# Patient Record
Sex: MALE | Race: WHITE | NOT HISPANIC OR LATINO | URBAN - METROPOLITAN AREA
[De-identification: names, ages, dates, MRNs, and addresses within clinical notes are randomized per-mention and may not be internally consistent; named-entity substitution may affect disease eponyms.]

---

## 2017-10-02 ENCOUNTER — HOSPITAL ENCOUNTER (OUTPATIENT)
Dept: RADIOLOGY | Facility: MEDICAL CENTER | Age: 62
End: 2017-10-02
Attending: ORTHOPAEDIC SURGERY
Payer: OTHER MISCELLANEOUS

## 2017-10-02 DIAGNOSIS — R06.00 DYSPNEA, PAROXYSMAL NOCTURNAL: ICD-10-CM

## 2017-10-02 PROCEDURE — 71020 DX-CHEST-2 VIEWS: CPT

## 2018-06-19 DIAGNOSIS — Z95.5 STENTED CORONARY ARTERY: Primary | ICD-10-CM

## 2018-06-28 ENCOUNTER — NON-PROVIDER VISIT (OUTPATIENT)
Dept: CARDIOLOGY | Facility: MEDICAL CENTER | Age: 63
End: 2018-06-28
Payer: COMMERCIAL

## 2018-06-28 DIAGNOSIS — Z95.5 HISTORY OF HEART ARTERY STENT: ICD-10-CM

## 2018-06-28 LAB — EKG IMPRESSION: NORMAL

## 2018-06-28 PROCEDURE — G0422 INTENS CARDIAC REHAB W/EXERC: HCPCS | Performed by: FAMILY MEDICINE

## 2018-06-28 PROCEDURE — G0423 INTENS CARDIAC REHAB NO EXER: HCPCS | Mod: 59 | Performed by: FAMILY MEDICINE

## 2018-06-28 ASSESSMENT — PATIENT HEALTH QUESTIONNAIRE - PHQ9: SUM OF ALL RESPONSES TO PHQ QUESTIONS 1-9: 13

## 2018-06-28 NOTE — PROGRESS NOTES
Cardiac Rehab Individual Treatment Plan Assessment: Initial 18 Session #     1   MRN: [id number]   Allergies: Morphine   Patient Name: Hector Hanson : 1955 Risk Stratification: High    Diagnoses:   1. History of heart artery stent     Age: 63 y.o. Physician: Anupam Merritt M.D.    Date of Event: 18 Specialist: Amos ([facility])   Risk Factors:  Hypertension, Hyperlipidemia, Obesity, Stress, Age, Male > 45   Exercise Nutrition Education Psychosocial   Stages of change Stages of change Stages of change Stages of change   Preparation Preparation Preparation Preparation   Fitness Test Lipids Learning Barriers Outcome Survey Tools   DIST: 1359  Available: Yes Learning Barriers: Ready to Learn FP QOL Overall Score: 14   Max HR: 80 Date: 11 Family Support PHQ-9: 13   RPE: 9 Total: 172 mg/dL Yes Nutrition Screen: 57 %   SPO2: 95 % Tri mg/dL Lives: Spouse Intervention   MET: 3.1 HDL: 27 mg/dL Tobacco Use Behavioral Health Consult: Yes   EF= 65 LDL: 89 mg/dL History   Smoking Status   • Former Smoker   • Packs/day: 1.50   • Years: 13.00   • Types: Cigarettes, Cigars   • Quit date: 1981   Smokeless Tobacco   • Never Used      Physician Referral: Yes   Ambulatory Status Diabetes Smoking Intervention Identifies Stressors: Yes   Fall Risk Assessed: Yes Diabetes: No Smoking Cessation Referral: N/A Drug Intervention: No   Exercise Ambulatory Status Assist Devices: None HbA1C:  (n/a) Date:  (not available) Ind. Education / Counseling: N/A Education   Exercise Prescription Monitors BS at Home: No Tobacco Adjunct: N/A Psychosocial Education: Coping Techniques, Positive Support System, S/S Depression   Mode: Treadmill, NuStep, UBE, Airdyne   Frequency:  (n/a) Random BS:  (n/a) Education Intervention Target Goal   Frequency:  2 days/week Weight Management Healthy Heart Education: Class Schedule Given, Medications Reviewed, Patient Education Binder Provided, Risk Factors Discussed,  "Videos Viewed per Huma Assess presence or absence of depression using a valid screening: Yes   Duration:  15 to 20 mins Weight: 100.7 kg (222 lb) Target Goal Use Stress Management: Yes   Intensity:  11-13 RPE Height: 175.3 cm (5' 9.02\") Complete Tobacco Cessation: Complete Tobacco Cessation: N/A Adverse Events: Yes   METS:  3.0-5.0 BMI (Calculated): 32.77 Educate / Review and have understanding of cardiac disease prevention: Educate / Review and have understanding of cardiac disease prevention: Yes Unexpected Events: Yes   Progression:  ^ increments of 1-3 to THR/RPE as tolerated Goal weight: 200 Medication Compliance: Yes    THR: THR: Other (see comment) History   Alcohol Use   • Yes     Comment: 2-3 drinks a week                        2 cigars a month, none anymore either         Angina with Exercise?  Angina with Exercise: No (113-123)      Resistance Training?  Resistance Training: Yes      Hypertension      Diagnosed with HTN: Yes Intervention      Resting BP: 147/81 Dietician Consult/Class: Pending      Peak Ex BP: 165/78 Nurse/Patient Discussion: Yes     Intervention Diabetes Ed Referral: N/A     Home Exercise:  Yes Discuss Maintenance /Wt Loss: Yes     Mode: Walk Attend [employer]: Pending     Duration: 10 to 20 mins Dietary Goal: >=58%Rate Your Plate     Frequency: 7 days active Education     Education Nutrition Education: Healthy Eating, Sodium Reduction     Equipment Orientation, Exercise Safety, S/S to Report, RPE Scale, Warm Up / Cool Down, Physically Active Target Goal     Target Goal LDL-C < 100 if trig. > 200:  N/A       Start Individual Exercise Rx Yes LDL-C < 70 for high risk patient:  Yes       BP < 140/90 or < 130/80 if DM or CKD Yes Non HDL-C Should be < 130:  Yes       Aerobic activity 30 + min / day 5 days / wk Yes HbA1C < 7%: N/A         BMI < 25: Yes       Notes: Hector had 9 stents placed on 5/23/2018.  We will monitor all 36 visits.  He has a 45 degree limitation on the left " "shoulder.  He has had multiple cervical, shoulders bilateral and knee surgery's.  Intial weight routine with <5#.  Initial Assessment:  Heart Sounds: S1 S2 audible and regular       Lung Sounds: Clear Bilateral       Edema: None       Right Peripheral Pulses:  Carotid: 2+  Radial: 2+  Dorsalis Pedis: 2+  Posterior Tibialis: 2+   Left Peripheral Pulses:  Carotid: 2+  Radial: 2+  Dorsalis Pedis: 2+  Posterior Tibialis: 2+    Incision: None      Color: Skin is pink and dry       Frame Size: Large        Cognitive Assessment: Memory is good with a very pleasant affect.      Fall Assessment:    Gait: Steady  Balance: Good  Upper Body: Right Shoulder 45 degree limitation.  Right arm Full ROM  Lower Body: Full ROM   Recent Falls: None  Current Medications and Vaccinations: Reviewed  Patient Stated Goals: \"I would like to get down to 200#, and I'm hoping to learn more about a heart healthy diet and hopefully sleep better.\"  Other: Exercise Current:  10 minutes walk daily.  Exercise Goal:  15 to 20 minutes daily walk.  Progress:  Active 7 days a week.  Nutrition Current:  Low fat, low cholesterol, low sodium.  Nutrition Goal:  Introduce Pritikin Program:  Incorporate more fruits and vegetables into daily meals.  Progress:  57 score on Rate Your Plate.  Stress Current:  Hector worries about his health just having 9 stents placed in one event, also having had multiple neck, shouler and knee surgeries.  Stress Goal:  Healthy mindset video and lecture.  Progress:  Hector presents today with a healthy positive attitude to build an exercise/diet program.       "

## 2018-06-29 NOTE — PROGRESS NOTES
Date: 6/28/2018    Initial Individual Treatment Plan review for the Pending sale to [facility] Intensive Cardiac Rehabilitation (ICR) Program.    Hector Hanson, 63 y.o. male, with qualifying diagnosis/diagnoses of S/P Stent.  Co-morbid conditions include Hypertension, Hyperlipidemia, Obesity, Stress, Age, Male > 45.    Primary Care Provider: Anupam Merritt M.D.    Heart Specialist (s): Dr. Trinidad    Patient has successfully completed 1 Exercise Sessions, and an appropriate number of corresponding Education Sessions.  Patient is an excellent candidate for the [facility] Intensive [facility] (ICR) Program.    I will review the Individual Treatment Plan again at 30 day post-initiation of ICR.    Alexander Burgos MD, [facility]  , [facility] Intensive [facility] ([facility]CR) Program

## 2018-07-02 ENCOUNTER — NON-PROVIDER VISIT (OUTPATIENT)
Dept: CARDIOLOGY | Facility: MEDICAL CENTER | Age: 63
End: 2018-07-02
Payer: COMMERCIAL

## 2018-07-02 DIAGNOSIS — Z95.5 HISTORY OF HEART ARTERY STENT: ICD-10-CM

## 2018-07-02 LAB — EKG IMPRESSION: NORMAL

## 2018-07-02 PROCEDURE — G0422 INTENS CARDIAC REHAB W/EXERC: HCPCS | Performed by: FAMILY MEDICINE

## 2018-07-02 PROCEDURE — G0423 INTENS CARDIAC REHAB NO EXER: HCPCS | Mod: 59 | Performed by: FAMILY MEDICINE

## 2018-07-02 NOTE — PROGRESS NOTES
Hector Hanson attended: Healthy Mindset Workshop from 9:00-10:00am.  Workshop Title: Targeting Your Nutrition Priorities.      Lecture was attended and patient questions addressed. The patient will continue workshops and nutrition education.

## 2018-07-06 ENCOUNTER — NON-PROVIDER VISIT (OUTPATIENT)
Dept: CARDIOLOGY | Facility: MEDICAL CENTER | Age: 63
End: 2018-07-06
Payer: COMMERCIAL

## 2018-07-06 DIAGNOSIS — Z95.5 HISTORY OF HEART ARTERY STENT: ICD-10-CM

## 2018-07-06 LAB — EKG IMPRESSION: NORMAL

## 2018-07-06 PROCEDURE — G0423 INTENS CARDIAC REHAB NO EXER: HCPCS | Mod: 59 | Performed by: FAMILY MEDICINE

## 2018-07-06 PROCEDURE — G0422 INTENS CARDIAC REHAB W/EXERC: HCPCS | Performed by: FAMILY MEDICINE

## 2018-07-06 NOTE — PROGRESS NOTES
Hector Hanson attending cooking school from 9:00-10:00 am   Today  prepared Pumpkin Pie.     Patient received handouts and nutrition information regarding the specific recipes.

## 2018-07-09 ENCOUNTER — NON-PROVIDER VISIT (OUTPATIENT)
Dept: CARDIOLOGY | Facility: MEDICAL CENTER | Age: 63
End: 2018-07-09
Payer: COMMERCIAL

## 2018-07-09 ENCOUNTER — NON-PROVIDER VISIT (OUTPATIENT)
Dept: CARDIOLOGY | Facility: MEDICAL CENTER | Age: 63
End: 2018-07-09

## 2018-07-09 DIAGNOSIS — Z95.5 HISTORY OF HEART ARTERY STENT: ICD-10-CM

## 2018-07-09 LAB — EKG IMPRESSION: NORMAL

## 2018-07-09 PROCEDURE — G0422 INTENS CARDIAC REHAB W/EXERC: HCPCS | Performed by: FAMILY MEDICINE

## 2018-07-09 PROCEDURE — G0423 INTENS CARDIAC REHAB NO EXER: HCPCS | Mod: 59 | Performed by: FAMILY MEDICINE

## 2018-07-09 NOTE — PROGRESS NOTES
Nutrition Consult Note:    Bindu Stokes  Date & Time note created:    7/9/2018   10:31 AM     Referring MD:   No ref. provider found  Time: 9:00-10:00 am     Patient ID:   Name:             Hector Hanson   YOB: 1955  Age:                 63 y.o.  male   MRN:               [id number]        Hector Hanson is here today for Intensive Cardiac Rehab.  This program includes Nutrition Education and Counseling following the Pritikin guidelines which includes at least 5 servings of vegetables, 4 servings of fruit, 2 or less servings of dairy, at least 5 servings of whole grains, animal fat from fish/lean meat, plant-based protein and no added salt (goal 1200-1500mg sodium per day).        Current Diet:  Patient is currently making drastic changes to his diet and has cut the amount of meat he is consuming by a significant degree. He has been provided with a list of alternative processed foods that are low in sugar, sodium and fat and will focus on making better choices. Patient reports that the men in his family pass away in their 50s/60s due to heart disease and he wishes to avoid this.     Current Weight:   222 lbs      IBWR:   160 lbs + 10% w/ large frame                            Current Living SItuation:  With spouse     Comprehensive Nutrition Education Instruction or training leading to in-depth nutrition related knowledge about:   Portion control  Discussed grocery shopping tips and label reading  Meal planning  Culinary instruction, including cooking without salt or added fat  The effect of sodium and saturated fats on heart disease risk factors   Identified ways to self monitor diet - food journaling  Reviewed benefits of exercise and helped pt set exercise goals      VIDEOS VIEWED:  2/16    Past Medical History:   Past Medical History:   Diagnosis Date   • Cancer 2015    skin (multiple lesions removed on face and one on left arm)   • Hypertension    • Pain    • Pneumonia 1980's   •  Sleep apnea     cpap   • Snoring        Past Surgical History:  Past Surgical History:   Procedure Laterality Date   • CERVICAL DISK AND FUSION ANTERIOR N/A 9/19/2016    Procedure: CERVICAL DISK AND FUSION ANTERIOR  C5-6 W/PLATE ;  Surgeon: Bravo Portillo M.D.;  Location: SURGERY [facility];  Service:    • CERVICAL JIM DISC N/A 9/19/2016    Procedure: CERVICAL DISC REPLACEMENT C3-4 ARTIFICIAL ;  Surgeon: Bravo Portillo M.D.;  Location: SURGERY [facility];  Service:    • HAND SURGERY Right 2013    reverse trigger finger release   • TRANS URETHRAL RESECTION PROSTATE  2013   • SHOULDER ARTHROSCOPY W/ ROTATOR CUFF REPAIR Right 2012    right bicep detachment repair too   • INGUINAL HERNIA REPAIR Left 2006   • OTHER     • SHOULDER ARTHROSCOPY W/ ROTATOR CUFF REPAIR Left 2014, 2015    bicep tear, rotoator cuff repair, redo 2015       Current Outpatient Medications:  Current Outpatient Prescriptions   Medication Sig Dispense Refill   • Oxycodone-Acetaminophen (PERCOCET-10)  MG Tab Take 1 Tab by mouth every four hours as needed for Moderate Pain. 60 Tab 0   • tizanidine (ZANAFLEX) 4 MG Tab Take 1 Tab by mouth 4 times a day. Indications: Muscle Spasticity 60 Tab 3   • MethylPREDNISolone (MEDROL DOSEPAK) 4 MG Tablet Therapy Pack Take as directed 30 Tab 1   • lisinopril (PRINIVIL, ZESTRIL) 40 MG tablet Take 40 mg by mouth every day.     • diazepam (VALIUM) 5 MG Tab Take 5 mg by mouth every 8 hours as needed for Anxiety.     • Multiple Vitamins-Minerals (MULTIVITAMIN ADULT PO) Take 2 Tabs by mouth every day.     • Coenzyme Q10 (CO Q 10) 100 MG Cap Take 300 mg by mouth every day.     • tizanidine (ZANAFLEX) 2 MG capsule Take 2 mg by mouth as needed. Indications: Muscle Spasticity     • Non Formulary Request Take 1 Cap by mouth every day. CLA capsule  Once daily     • fenofibrate (TRICOR) 145 MG TABS Take 145 mg by mouth every day.     • Oxycodone-Acetaminophen (PERCOCET-10)  MG TABS Take 1-2 Tabs by  mouth every four hours as needed for Moderate Pain.       No current facility-administered medications for this visit.          Allergies:  Allergies   Allergen Reactions   • Morphine Vomiting and Nausea       Family History:  No family history on file.    Social History:  Social History     Social History   • Marital status:      Spouse name: N/A   • Number of children: N/A   • Years of education: N/A     Occupational History   • Not on file.     Social History Main Topics   • Smoking status: Former Smoker     Packs/day: 1.50     Years: 13.00     Types: Cigarettes, Cigars     Quit date: 1/1/1981   • Smokeless tobacco: Never Used   • Alcohol use Yes      Comment: 2-3 drinks a week                        2 cigars a month, none anymore either   • Drug use: No   • Sexual activity: Not on file     Other Topics Concern   • Not on file     Social History Narrative   • No narrative on file       Strengths:  Supportive spouse, high motivation    Barriers:   Pt reports familial high cholesterol    Goals set at this visit:  1. Patient will review patient resources and focus on limiting meat to every other day.   2. Patient will watch an educational video in addition to education on Mondays/Fridays     Follow up:  As needed.

## 2018-07-13 ENCOUNTER — NON-PROVIDER VISIT (OUTPATIENT)
Dept: CARDIOLOGY | Facility: MEDICAL CENTER | Age: 63
End: 2018-07-13
Payer: COMMERCIAL

## 2018-07-13 DIAGNOSIS — Z95.5 HISTORY OF HEART ARTERY STENT: ICD-10-CM

## 2018-07-13 LAB — EKG IMPRESSION: NORMAL

## 2018-07-13 PROCEDURE — G0423 INTENS CARDIAC REHAB NO EXER: HCPCS | Mod: 59 | Performed by: FAMILY MEDICINE

## 2018-07-13 PROCEDURE — G0422 INTENS CARDIAC REHAB W/EXERC: HCPCS | Performed by: FAMILY MEDICINE

## 2018-07-13 NOTE — PROGRESS NOTES
Hector Hanson attending cooking school from  9:00-10:00 am   Today  prepared Paella.     Patient received handouts and nutrition information regarding the specific recipes.

## 2018-07-16 ENCOUNTER — NON-PROVIDER VISIT (OUTPATIENT)
Dept: CARDIOLOGY | Facility: MEDICAL CENTER | Age: 63
End: 2018-07-16
Payer: COMMERCIAL

## 2018-07-16 DIAGNOSIS — Z95.5 HISTORY OF HEART ARTERY STENT: ICD-10-CM

## 2018-07-16 LAB — EKG IMPRESSION: NORMAL

## 2018-07-16 PROCEDURE — G0423 INTENS CARDIAC REHAB NO EXER: HCPCS | Mod: 59 | Performed by: FAMILY MEDICINE

## 2018-07-16 PROCEDURE — G0422 INTENS CARDIAC REHAB W/EXERC: HCPCS | Performed by: FAMILY MEDICINE

## 2018-07-16 ASSESSMENT — PATIENT HEALTH QUESTIONNAIRE - PHQ9: SUM OF ALL RESPONSES TO PHQ QUESTIONS 1-9: 13

## 2018-07-16 NOTE — PROGRESS NOTES
Hector Hanson attending [employer] from  Healthy Mindset Workshop from 9:00-10:00 am.      The topic was: New Thoughts, New Behaviors.     Patient received handouts regarding the specific exercise information.

## 2018-07-16 NOTE — PROGRESS NOTES
Cardiac Rehab Individual Treatment Plan Assessment: 30 day 07/16/18 Session #     6   MRN: [id number]   Allergies: Morphine   Patient Name: Hector Hanson : 1955 Risk Stratification: High    Diagnoses:   1. History of heart artery stent     Age: 63 y.o. Physician: Anupam Merritt M.D.    Date of Event: 18 Specialist: Amos ([facility])   Risk Factors:  Hypertension, Hyperlipidemia, Obesity, Stress, Age, Male > 45   Exercise Nutrition Education Psychosocial   Stages of change Stages of change Stages of change Stages of change   Preparation Preparation Preparation Preparation   Fitness Test Lipids Learning Barriers Outcome Survey Tools   DIST: 1359 (orientation data)  Available: Yes Learning Barriers: Ready to Learn FP QOL Overall Score: 14 (orientation data)   Max HR: 80 (orientation data) Date: 11 Family Support PHQ-9: 13 (orientation data)   RPE: 9 (orientation data) Total: 172 mg/dL Yes Nutrition Screen: 57 % (orientation data)   SPO2: 95 % (orientation data) Tri mg/dL Lives: Spouse Intervention   MET: 3.1 (orientation data) HDL: 27 mg/dL Tobacco Use Behavioral Health Consult: N/A (assessed pre and post program)   EF= 65 LDL: 89 mg/dL History   Smoking Status   • Former Smoker   • Packs/day: 1.50   • Years: 13.00   • Types: Cigarettes, Cigars   • Quit date: 1981   Smokeless Tobacco   • Never Used      Physician Referral: No   Ambulatory Status Diabetes Smoking Intervention Identifies Stressors: Yes   Fall Risk Assessed: Yes Diabetes: No Smoking Cessation Referral: N/A Drug Intervention: No   Exercise Ambulatory Status Assist Devices: None HbA1C:  (n/a) Date:  (not available) Ind. Education / Counseling: N/A Education   Exercise Prescription Monitors BS at Home: No Tobacco Adjunct: N/A Psychosocial Education: Coping Techniques, Positive Support System, S/S Depression   Mode: Treadmill, NuStep, UBE, Airdyne   Frequency:  (n/a) Random BS:  (n/a) Education Intervention  "Target Goal   Frequency:  2 days/week Weight Management Healthy Heart Education: Class Schedule Given, Medications Reviewed, Patient Education Binder Provided, Risk Factors Discussed, Videos Viewed per Huma Assess presence or absence of depression using a valid screening: N/A (assessed pre and post program)   Duration:  15 to 20 mins Weight: 100.7 kg (222 lb) Target Goal Use Stress Management: Yes   Intensity:  11-13 RPE Height: 175.3 cm (5' 9.02\") Complete Tobacco Cessation: Complete Tobacco Cessation: N/A Adverse Events: No   METS:  3.0-5.0 BMI (Calculated): 32.77 Educate / Review and have understanding of cardiac disease prevention: Educate / Review and have understanding of cardiac disease prevention: Yes Unexpected Events: No   Progression:  ^ increments of 1-3 to THR/RPE as tolerated Goal weight: 200 Medication Compliance: Yes    THR: THR: Other (see comment) History   Alcohol Use   • Yes     Comment: 2-3 drinks a week                        2 cigars a month, none anymore either         Angina with Exercise?  Angina with Exercise: No (113-123)      Resistance Training?  Resistance Training: Yes      Hypertension      Diagnosed with HTN: Yes Intervention      Resting BP: 129/70 Dietician Consult/Class: Yes      Peak Ex BP:  (no data) Nurse/Patient Discussion: Yes     Intervention Diabetes Ed Referral: N/A     Home Exercise:  Yes Discuss Maintenance /Wt Loss: Yes     Mode: Walk Attend Cooking School: Yes     Duration: 15-20 mins Dietary Goal: >=58%Rate Your Plate     Frequency: 7 days active Education     Education Nutrition Education: Healthy Eating, Sodium Reduction     Equipment Orientation, Exercise Safety, S/S to Report, RPE Scale, Warm Up / Cool Down, Physically Active Target Goal     Target Goal LDL-C < 100 if trig. > 200:  N/A       Start Individual Exercise Rx Yes LDL-C < 70 for high risk patient:  Yes       BP < 140/90 or < 130/80 if DM or CKD Yes Non HDL-C Should be < 130:  Yes       Aerobic " "activity 30 + min / day 5 days / wk Yes HbA1C < 7%: N/A         BMI < 25: Yes       Notes:   Hector is on his 6th session today. Hector attends [facility] Mondays and Fridays. Hector says he feels like he is getting stronger every day.     Exercise:   Hector says he is walking everyday on his own for about 1.5 - 2 miles each time, twice a day. While at [facility] Hector is completing 3x 8 minutes for his aerobics, plus weights.     New goal: Hector has a goal to start going to the gym on Wednesdays.     Nutrition:   Hector says he has started implementing the Pritikin eating plan. Hector says, \"I have stopped eating red meat completely.\" Hector says he eats oatmeal everyday with fruit. He eats more veggies and makes it a point to eat fruit 3 x a day. Hector says, \"I only eat animal protein 1 x a day which is a fish or some chicken, I have stopped eating eggs and I started eating tofu.\"     New goal: Hector says he has been struggling with eating his nightly bowl of ice cream. Hector says he has a goal to limit his ice cream intake to 3-4 nights a week instead of his usual 7 nights a week.     Stress:   Hector says he doesn't have any big stressors. I have just small stressors and I feel that I manage them well with rest and reading.     New goal: Hector doesn't have any stress management goals at this time.                                 "

## 2018-07-18 NOTE — PROGRESS NOTES
Intensive Cardiac Rehabilitation (ICR) Individual Treatment Plan (ITP) reviewed and signed.  Sincerely,  Alexander Burgos MD

## 2018-07-20 ENCOUNTER — NON-PROVIDER VISIT (OUTPATIENT)
Dept: CARDIOLOGY | Facility: MEDICAL CENTER | Age: 63
End: 2018-07-20
Payer: COMMERCIAL

## 2018-07-20 DIAGNOSIS — Z95.5 HISTORY OF HEART ARTERY STENT: ICD-10-CM

## 2018-07-20 LAB — EKG IMPRESSION: NORMAL

## 2018-07-20 PROCEDURE — G0422 INTENS CARDIAC REHAB W/EXERC: HCPCS | Performed by: FAMILY MEDICINE

## 2018-07-20 PROCEDURE — G0423 INTENS CARDIAC REHAB NO EXER: HCPCS | Mod: 59 | Performed by: FAMILY MEDICINE

## 2018-07-23 ENCOUNTER — NON-PROVIDER VISIT (OUTPATIENT)
Dept: CARDIOLOGY | Facility: MEDICAL CENTER | Age: 63
End: 2018-07-23
Payer: COMMERCIAL

## 2018-07-23 DIAGNOSIS — Z95.5 HISTORY OF HEART ARTERY STENT: ICD-10-CM

## 2018-07-23 LAB — EKG IMPRESSION: NORMAL

## 2018-07-23 PROCEDURE — G0423 INTENS CARDIAC REHAB NO EXER: HCPCS | Mod: 59 | Performed by: FAMILY MEDICINE

## 2018-07-23 PROCEDURE — G0422 INTENS CARDIAC REHAB W/EXERC: HCPCS | Performed by: FAMILY MEDICINE

## 2018-07-23 NOTE — PROGRESS NOTES
Hector Hanson attended the following workshop from 9:00-10:00 am.   Workshop Title: Fueling a Healthy Body.       Lecture was attended and patient questions addressed. The patient will continue workshops and nutrition education.

## 2018-07-27 ENCOUNTER — NON-PROVIDER VISIT (OUTPATIENT)
Dept: CARDIOLOGY | Facility: MEDICAL CENTER | Age: 63
End: 2018-07-27
Payer: COMMERCIAL

## 2018-07-27 DIAGNOSIS — Z95.5 HISTORY OF HEART ARTERY STENT: ICD-10-CM

## 2018-07-27 LAB — EKG IMPRESSION: NORMAL

## 2018-07-27 PROCEDURE — G0423 INTENS CARDIAC REHAB NO EXER: HCPCS | Mod: 59 | Performed by: FAMILY MEDICINE

## 2018-07-27 PROCEDURE — G0422 INTENS CARDIAC REHAB W/EXERC: HCPCS | Performed by: FAMILY MEDICINE

## 2018-08-01 ENCOUNTER — NON-PROVIDER VISIT (OUTPATIENT)
Dept: CARDIOLOGY | Facility: MEDICAL CENTER | Age: 63
End: 2018-08-01
Payer: COMMERCIAL

## 2018-08-01 DIAGNOSIS — Z95.5 HISTORY OF HEART ARTERY STENT: ICD-10-CM

## 2018-08-01 LAB — EKG IMPRESSION: NORMAL

## 2018-08-01 PROCEDURE — G0423 INTENS CARDIAC REHAB NO EXER: HCPCS | Mod: 59 | Performed by: FAMILY MEDICINE

## 2018-08-01 PROCEDURE — G0422 INTENS CARDIAC REHAB W/EXERC: HCPCS | Performed by: FAMILY MEDICINE

## 2018-08-03 ENCOUNTER — NON-PROVIDER VISIT (OUTPATIENT)
Dept: CARDIOLOGY | Facility: MEDICAL CENTER | Age: 63
End: 2018-08-03
Payer: COMMERCIAL

## 2018-08-03 DIAGNOSIS — Z95.5 HISTORY OF HEART ARTERY STENT: ICD-10-CM

## 2018-08-03 LAB — EKG IMPRESSION: NORMAL

## 2018-08-03 PROCEDURE — G0422 INTENS CARDIAC REHAB W/EXERC: HCPCS | Performed by: FAMILY MEDICINE

## 2018-08-03 PROCEDURE — G0423 INTENS CARDIAC REHAB NO EXER: HCPCS | Mod: 59 | Performed by: FAMILY MEDICINE

## 2018-08-03 NOTE — PROGRESS NOTES
Hector Hanson attending cooking school from  9-10:00am.    Today  prepared Borscht.     Patient received handouts and nutrition information regarding the specific recipes.

## 2018-08-06 ENCOUNTER — NON-PROVIDER VISIT (OUTPATIENT)
Dept: CARDIOLOGY | Facility: MEDICAL CENTER | Age: 63
End: 2018-08-06
Payer: COMMERCIAL

## 2018-08-06 DIAGNOSIS — Z95.5 HISTORY OF HEART ARTERY STENT: ICD-10-CM

## 2018-08-06 LAB — EKG IMPRESSION: NORMAL

## 2018-08-06 PROCEDURE — G0422 INTENS CARDIAC REHAB W/EXERC: HCPCS | Performed by: FAMILY MEDICINE

## 2018-08-06 PROCEDURE — G0423 INTENS CARDIAC REHAB NO EXER: HCPCS | Mod: 59 | Performed by: FAMILY MEDICINE

## 2018-08-06 ASSESSMENT — PATIENT HEALTH QUESTIONNAIRE - PHQ9: SUM OF ALL RESPONSES TO PHQ QUESTIONS 1-9: 13

## 2018-08-06 NOTE — NON-PROVIDER
Hector Hanson attended: Healthy Mindset Workshop from 9-10am    The topic was: New Thoughts/New Behaviors-Goal Setting    Patient received handouts regarding the specific information

## 2018-08-06 NOTE — PROGRESS NOTES
Cardiac Rehab Individual Treatment Plan Assessment: 60 day 18 Session #     12   MRN: [id number]   Allergies: Morphine   Patient Name: Hector Hanson : 1955 Risk Stratification: High    Diagnoses:   1. History of heart artery stent     Age: 63 y.o. Physician: Anupam Merritt M.D.    Date of Event: 18 Specialist: Amos ([facility])   Risk Factors:  Hypertension, Hyperlipidemia, Obesity, Stress, Age, Male > 45   Exercise Nutrition Education Psychosocial   Stages of change Stages of change Stages of change Stages of change   Preparation Preparation Preparation Preparation   Fitness Test Lipids Learning Barriers Outcome Survey Tools   DIST: 1359 (orientation data)  Available: Yes Learning Barriers: Ready to Learn FP QOL Overall Score: 14 (orientation data)   Max HR: 80 (orientation data) Date: 11 Family Support PHQ-9: 13 (orientation data)   RPE: 9 (orientation data) Total: 172 mg/dL Yes Nutrition Screen: 57 % (orientation data)   SPO2: 95 % (orientation data) Tri mg/dL Lives: Spouse Intervention   MET: 3.1 (orientation data) HDL: 27 mg/dL Tobacco Use Behavioral Health Consult: N/A (assessed pre and post program)   EF= 65 LDL: 89 mg/dL History   Smoking Status   • Former Smoker   • Packs/day: 1.50   • Years: 13.00   • Types: Cigarettes, Cigars   • Quit date: 1981   Smokeless Tobacco   • Never Used      Physician Referral: No   Ambulatory Status Diabetes Smoking Intervention Identifies Stressors: Yes   Fall Risk Assessed: Yes Diabetes: No Smoking Cessation Referral: N/A Drug Intervention: No   Exercise Ambulatory Status Assist Devices: None HbA1C:  (n/a) Date:  (not available) Ind. Education / Counseling: N/A Education   Exercise Prescription Monitors BS at Home: No Tobacco Adjunct: N/A Psychosocial Education: Coping Techniques, Positive Support System, S/S Depression   Mode: Treadmill, NuStep, UBE, Airdyne   Frequency:  (n/a) Random BS:  (n/a) Education Intervention  "Target Goal   Frequency:  2 days/week Weight Management Healthy Heart Education: Class Schedule Given, Medications Reviewed, Patient Education Binder Provided, Risk Factors Discussed, Videos Viewed per Huma Assess presence or absence of depression using a valid screening: N/A (assessed pre and post program)   Duration:  15 to 20 mins Weight: 100.7 kg (222 lb) Target Goal Use Stress Management: Yes   Intensity:  11-13 RPE Height: 175.3 cm (5' 9.02\") Complete Tobacco Cessation: Complete Tobacco Cessation: N/A Adverse Events: No   METS:  3.0-5.0 BMI (Calculated): 32.77 Educate / Review and have understanding of cardiac disease prevention: Educate / Review and have understanding of cardiac disease prevention: Yes Unexpected Events: No   Progression:  ^ increments of 1-3 to THR/RPE as tolerated Goal weight: 200 Medication Compliance: Yes    THR: THR: Other (see comment) History   Alcohol Use   • Yes     Comment: 2-3 drinks a week                        2 cigars a month, none anymore either         Angina with Exercise?  Angina with Exercise: No (113-123)      Resistance Training?  Resistance Training: Yes      Hypertension      Diagnosed with HTN: Yes Intervention      Resting BP: 146/75 Dietician Consult/Class: Yes      Peak Ex BP:  (no data) Nurse/Patient Discussion: Yes     Intervention Diabetes Ed Referral: N/A     Home Exercise:  Yes Discuss Maintenance /Wt Loss: Yes     Mode: Walk Attend Cooking School: Yes     Duration: 15-20 mins Dietary Goal: >=58%Rate Your Plate     Frequency: 7 days active Education     Education Nutrition Education: Healthy Eating, Sodium Reduction     Equipment Orientation, Exercise Safety, S/S to Report, RPE Scale, Warm Up / Cool Down, Physically Active Target Goal     Target Goal LDL-C < 100 if trig. > 200:  N/A       Start Individual Exercise Rx Yes LDL-C < 70 for high risk patient:  Yes       BP < 140/90 or < 130/80 if DM or CKD Yes Non HDL-C Should be < 130:  Yes       Aerobic " activity 30 + min / day 5 days / wk Yes HbA1C < 7%: N/A         BMI < 25: Yes          Notes:   Hector is on his 12th session. Hector attends [facility] Mondays and Fridays. Hector is motivated to improve his strength and endurance.     Exercise:   Hector has continued to walk about 3 mile a day on his own, twice a day. While at [facility] Hector has started walking on the treadmill for 30 minutes continuous, plus weights. Hector says he is starting to feel good again and enjoys the program.     Previous goal: Hector had a goal to start going to the gym on Wednesday but his schedule hasn't allowed him to start that routine yet.     New goal: Hector has a goal to start going to the gym to lift on Wednesdays.     Nutrition:  Hector says he is starting to tweak little things in his diet more and more to make it more Pritikin friendly. Hector has continued to refrain from eating red meat and high sodium food. Hector says he has been mindful to eat more fruits and veggies.      Previous goal: Hector had a goal to limit his ice cream eating to 3 nights a week. This goal was met Hector says he eats ice cream only 2 nights a week now instead of 7 nights a week.     New goal: Hector said he doesn't need a dietary goal right now.     Stress:   Hector has continued to remain stress free and feeling happy and healthy enough to fernando any stressors as they arise.     New goal:  Hector doesn't feel the need to make any stress management goals at this time.

## 2018-08-13 ENCOUNTER — NON-PROVIDER VISIT (OUTPATIENT)
Dept: CARDIOLOGY | Facility: MEDICAL CENTER | Age: 63
End: 2018-08-13
Payer: COMMERCIAL

## 2018-08-13 DIAGNOSIS — Z95.5 HISTORY OF HEART ARTERY STENT: ICD-10-CM

## 2018-08-13 LAB — EKG IMPRESSION: NORMAL

## 2018-08-13 PROCEDURE — G0423 INTENS CARDIAC REHAB NO EXER: HCPCS | Mod: 59 | Performed by: FAMILY MEDICINE

## 2018-08-13 PROCEDURE — G0422 INTENS CARDIAC REHAB W/EXERC: HCPCS | Performed by: FAMILY MEDICINE

## 2018-08-13 NOTE — PROGRESS NOTES
Hector Hanson attended the following workshop from 9:00-10:00am.   Workshop Title: Label Reading.       Lecture was attended and patient questions addressed. The patient will continue workshops and nutrition education.

## 2018-08-17 ENCOUNTER — NON-PROVIDER VISIT (OUTPATIENT)
Dept: CARDIOLOGY | Facility: MEDICAL CENTER | Age: 63
End: 2018-08-17
Payer: COMMERCIAL

## 2018-08-17 DIAGNOSIS — Z95.5 HISTORY OF HEART ARTERY STENT: ICD-10-CM

## 2018-08-17 LAB — EKG IMPRESSION: NORMAL

## 2018-08-17 PROCEDURE — G0422 INTENS CARDIAC REHAB W/EXERC: HCPCS | Performed by: FAMILY MEDICINE

## 2018-08-17 PROCEDURE — G0423 INTENS CARDIAC REHAB NO EXER: HCPCS | Mod: 59 | Performed by: FAMILY MEDICINE

## 2018-08-17 NOTE — PROGRESS NOTES
Hector Hanson attending cooking school from  9:00-10:00am.  Today  prepared Pumpkin Pancakes with Fresh Balsamic Peach Syrup.    Patient received handouts and nutrition information regarding the specific recipes.

## 2018-08-20 ENCOUNTER — NON-PROVIDER VISIT (OUTPATIENT)
Dept: CARDIOLOGY | Facility: MEDICAL CENTER | Age: 63
End: 2018-08-20
Payer: COMMERCIAL

## 2018-08-20 DIAGNOSIS — Z95.5 HISTORY OF HEART ARTERY STENT: ICD-10-CM

## 2018-08-20 LAB — EKG IMPRESSION: NORMAL

## 2018-08-20 PROCEDURE — G0422 INTENS CARDIAC REHAB W/EXERC: HCPCS | Performed by: FAMILY MEDICINE

## 2018-08-20 PROCEDURE — G0423 INTENS CARDIAC REHAB NO EXER: HCPCS | Mod: 59 | Performed by: FAMILY MEDICINE

## 2018-08-20 NOTE — PROGRESS NOTES
Hector Hanson attended: Exercise Workshop from 3:30-4:30pm.        The topic was: Balance.     Patient received handouts regarding the specific exercise information

## 2018-08-24 ENCOUNTER — NON-PROVIDER VISIT (OUTPATIENT)
Dept: CARDIOLOGY | Facility: MEDICAL CENTER | Age: 63
End: 2018-08-24
Payer: COMMERCIAL

## 2018-08-24 DIAGNOSIS — Z95.5 HISTORY OF HEART ARTERY STENT: ICD-10-CM

## 2018-08-24 LAB — EKG IMPRESSION: NORMAL

## 2018-08-24 PROCEDURE — G0422 INTENS CARDIAC REHAB W/EXERC: HCPCS | Performed by: FAMILY MEDICINE

## 2018-08-24 PROCEDURE — G0423 INTENS CARDIAC REHAB NO EXER: HCPCS | Mod: 59 | Performed by: FAMILY MEDICINE

## 2018-08-24 NOTE — PROGRESS NOTES
Hector Hanson attending cooking school from  3:30-4:30 pm  Today  prepared Savory Chopped Salad and Horseradish Balsamic Dressing.    Patient received handouts and nutrition information regarding the specific recipes.

## 2018-08-27 ENCOUNTER — NON-PROVIDER VISIT (OUTPATIENT)
Dept: CARDIOLOGY | Facility: MEDICAL CENTER | Age: 63
End: 2018-08-27
Payer: COMMERCIAL

## 2018-08-27 DIAGNOSIS — Z95.5 HISTORY OF HEART ARTERY STENT: ICD-10-CM

## 2018-08-27 LAB — EKG IMPRESSION: NORMAL

## 2018-08-27 PROCEDURE — G0422 INTENS CARDIAC REHAB W/EXERC: HCPCS | Performed by: FAMILY MEDICINE

## 2018-08-27 PROCEDURE — G0423 INTENS CARDIAC REHAB NO EXER: HCPCS | Mod: 59 | Performed by: FAMILY MEDICINE

## 2018-08-27 NOTE — PROGRESS NOTES
Hector Hanson attended: Healthy Mindset Workshop from 3:30-4:30pm.    The topic was: Stress.    Patient received handouts regarding the specific exercise information.

## 2018-08-28 NOTE — PROGRESS NOTES
Cardiac Rehab Individual Treatment Plan Assessment: 90 day 08/27/18 Session #     17   MRN: [id number]   Allergies: Morphine   Patient Name: Hector Hanson : 1955 Risk Stratification: High    Diagnoses:   1. History of heart artery stent     Age: 63 y.o. Physician: Anupam Merritt M.D.    Date of Event: 18 Specialist: Amos ([facility])   Risk Factors:  Hypertension, Hyperlipidemia, Obesity, Stress, Age, Male > 45   Exercise Nutrition Education Psychosocial   Stages of change Stages of change Stages of change Stages of change   Preparation Preparation Preparation Preparation   Fitness Test Lipids Learning Barriers Outcome Survey Tools   DIST:  (assessed pre- and post-program)  Available: No new Learning Barriers: Ready to Learn FP QOL Overall Score:  (n/a)   Max HR:  (assessed pre- and post-program) Date: 11 Family Support PHQ-9:  (n/a)   RPE:  (assessed pre- and post-program) Total: 172 mg/dL Yes Nutrition Screen:  (n/a)   SPO2:  (assessed pre- and post-program) Tri mg/dL Lives: Spouse Intervention   MET:  (assessed pre- and post-program) HDL: 27 mg/dL Tobacco Use Behavioral Health Consult: N/A (assessed pre and post program)   EF= 65 LDL: 89 mg/dL History   Smoking Status   • Former Smoker   • Packs/day: 1.50   • Years: 13.00   • Types: Cigarettes, Cigars   • Quit date: 1981   Smokeless Tobacco   • Never Used      Physician Referral: No   Ambulatory Status Diabetes Smoking Intervention Identifies Stressors: Yes   Fall Risk Assessed: Yes Diabetes: No Smoking Cessation Referral: N/A Drug Intervention: No   Exercise Ambulatory Status Assist Devices: None HbA1C:  (n/a) Date:  (not available) Ind. Education / Counseling: N/A Education   Exercise Prescription Monitors BS at Home: No Tobacco Adjunct: N/A Psychosocial Education: Coping Techniques, Positive Support System, S/S Depression   Mode: Treadmill, NuStep, UBE, Airdyne   Frequency:  (n/a) Random BS:  (n/a) Education  "Intervention Target Goal   Frequency:  2 days/week Weight Management Healthy Heart Education: Class Schedule Given, Medications Reviewed, Patient Education Binder Provided, Risk Factors Discussed, Videos Viewed per Huma Assess presence or absence of depression using a valid screening: Yes   Duration:  30-45 minutes Weight: 100.2 kg (221 lb) Target Goal Use Stress Management: Yes   Intensity:  11-13 RPE Height: 175.3 cm (5' 9.02\") Complete Tobacco Cessation: Complete Tobacco Cessation: N/A Adverse Events: No   METS:  Other (3.0-5.5 METS) BMI (Calculated): 32.62 Educate / Review and have understanding of cardiac disease prevention: Educate / Review and have understanding of cardiac disease prevention: Yes Unexpected Events: No   Progression:  ^ increments of 1-3 to THR/RPE as tolerated Goal weight: 200 Medication Compliance: Yes    THR: THR: Other (see comment) (113-123) History   Alcohol Use   • Yes     Comment: 2-3 drinks a week                        2 cigars a month, none anymore either         Angina with Exercise?  Angina with Exercise: No      Resistance Training?  Resistance Training: Yes      Hypertension      Diagnosed with HTN: Yes Intervention      Resting BP: 128/67 Dietician Consult/Class: Yes      Peak Ex BP:  (assessed pre- and post-program) Nurse/Patient Discussion: Yes     Intervention Diabetes Ed Referral: N/A     Home Exercise:  Yes Discuss Maintenance /Wt Loss: Yes     Mode: Walk Attend Cooking School: Yes     Duration: 20-30 mins Dietary Goal: >=58%Rate Your Plate     Frequency: 7 days active Education     Education Nutrition Education: Healthy Eating, Sodium Reduction     Exercise Safety, S/S to Report, RPE Scale, Warm Up / Cool Down, Physically Active Target Goal     Target Goal LDL-C < 100 if trig. > 200:  Yes       Start Individual Exercise Rx Yes LDL-C < 70 for high risk patient:  Yes       BP < 140/90 or < 130/80 if DM or CKD Yes Non HDL-C Should be < 130:  Yes       Aerobic activity 30 " + min / day 5 days / wk Yes HbA1C < 7%: N/A         BMI < 25: Yes       Notes: Exercise: Current: Hector is completing one aerobic session for 30 minutes total in [facility], plus an Intermediate weight routine using 9lb dumbbells. He is walking on the treadmill at 2.5mph, 2% grade, 5.3 METS. His knee was limiting his walking today. RN discussed alternating with the stationary bike for some of his 30 minute sessions. At home he is walking every day for 20-30 minutes. He states he has a gym in their complex but he has his own gym that he is trying to motivate to return to. RN and Hector discussed expectations. Goal: To lose weight and increase fitness. Progress: Hector feels that he is not making progress as fast as he would wish but he does notice that he is feeling better overall. Nutrition: Current: Hector states he is eating more fish, only eats red meat once every 3 to 4 weeks, may eat 3 to 4 egg whites per week but often none. He is eating oatmeal for breakfast with almond milk and has cut down his ice cream consumption to a couple nights per week and selecting lighter sugar/fat varieties. He enjoys fruits and vegetables but because they spoil quickly he may have days he doesn't have any in the house. RN discussed buying frozen fruits and vegetables to have on hand and breakfast preparation options now that he has college classes early in the morning. Goal: To continue to adopt heart-healthy eating habits. Progress: Hector has been making progress in adopting a more heart healthy diet. He is a  for events so is looking at the healthy meals as a culinary challenge. 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 Goal: Hector states that he finds hikes on [place] and fishing to be stress-relieving activities for him. He wants to try to make time for those in his routine. Progress: Hector states that his wife is  supportive and he is getting better about voicing his stressors.

## 2018-08-31 ENCOUNTER — NON-PROVIDER VISIT (OUTPATIENT)
Dept: CARDIOLOGY | Facility: MEDICAL CENTER | Age: 63
End: 2018-08-31
Payer: COMMERCIAL

## 2018-08-31 DIAGNOSIS — Z95.5 HISTORY OF HEART ARTERY STENT: ICD-10-CM

## 2018-08-31 LAB — EKG IMPRESSION: NORMAL

## 2018-08-31 PROCEDURE — G0423 INTENS CARDIAC REHAB NO EXER: HCPCS | Mod: 59 | Performed by: FAMILY MEDICINE

## 2018-08-31 PROCEDURE — G0422 INTENS CARDIAC REHAB W/EXERC: HCPCS | Performed by: FAMILY MEDICINE

## 2018-09-01 NOTE — PROGRESS NOTES
Hector Hanson attended: cooking school from  3:30-4:30pm.   Today  prepared Bread Pudding with Pear Syrup.    Patient received handouts and nutrition information regarding the specific recipes.

## 2018-09-07 ENCOUNTER — NON-PROVIDER VISIT (OUTPATIENT)
Dept: CARDIOLOGY | Facility: MEDICAL CENTER | Age: 63
End: 2018-09-07
Payer: COMMERCIAL

## 2018-09-07 DIAGNOSIS — Z95.5 HISTORY OF HEART ARTERY STENT: ICD-10-CM

## 2018-09-07 LAB — EKG IMPRESSION: NORMAL

## 2018-09-07 PROCEDURE — G0423 INTENS CARDIAC REHAB NO EXER: HCPCS | Mod: 59 | Performed by: FAMILY MEDICINE

## 2018-09-07 PROCEDURE — G0422 INTENS CARDIAC REHAB W/EXERC: HCPCS | Performed by: FAMILY MEDICINE

## 2018-09-07 NOTE — PROGRESS NOTES
Hector Hanson attended: cooking school from  3:30-4:30pm. Today  prepared Valenzuela Tostadas.     Patient received handouts and nutrition information regarding the specific recipes.

## 2018-09-10 ENCOUNTER — NON-PROVIDER VISIT (OUTPATIENT)
Dept: CARDIOLOGY | Facility: MEDICAL CENTER | Age: 63
End: 2018-09-10
Payer: COMMERCIAL

## 2018-09-10 DIAGNOSIS — Z95.5 HISTORY OF HEART ARTERY STENT: ICD-10-CM

## 2018-09-10 LAB — EKG IMPRESSION: NORMAL

## 2018-09-10 PROCEDURE — G0423 INTENS CARDIAC REHAB NO EXER: HCPCS | Mod: 59 | Performed by: FAMILY MEDICINE

## 2018-09-10 PROCEDURE — G0422 INTENS CARDIAC REHAB W/EXERC: HCPCS | Performed by: FAMILY MEDICINE

## 2018-09-10 NOTE — PROGRESS NOTES
Hector Hanson attended the following workshop from 3:30-4:30pm.   Workshop Title: Menu Workshop.      Lecture was attended and patient questions addressed. The patient will continue workshops and nutrition education.

## 2018-09-14 ENCOUNTER — NON-PROVIDER VISIT (OUTPATIENT)
Dept: CARDIOLOGY | Facility: MEDICAL CENTER | Age: 63
End: 2018-09-14
Payer: COMMERCIAL

## 2018-09-14 DIAGNOSIS — Z95.5 HISTORY OF HEART ARTERY STENT: ICD-10-CM

## 2018-09-14 LAB — EKG IMPRESSION: NORMAL

## 2018-09-14 PROCEDURE — G0423 INTENS CARDIAC REHAB NO EXER: HCPCS | Mod: 59 | Performed by: FAMILY MEDICINE

## 2018-09-14 PROCEDURE — G0422 INTENS CARDIAC REHAB W/EXERC: HCPCS | Performed by: FAMILY MEDICINE

## 2018-09-14 NOTE — PROGRESS NOTES
Hector Hanson attended: cooking school from  3:30-4:30pm.   Today  prepared Marinara Sauce.     Patient received handouts and nutrition information regarding the specific recipes.

## 2018-09-17 ENCOUNTER — NON-PROVIDER VISIT (OUTPATIENT)
Dept: CARDIOLOGY | Facility: MEDICAL CENTER | Age: 63
End: 2018-09-17
Payer: COMMERCIAL

## 2018-09-17 DIAGNOSIS — Z95.5 HISTORY OF HEART ARTERY STENT: ICD-10-CM

## 2018-09-17 LAB — EKG IMPRESSION: NORMAL

## 2018-09-17 PROCEDURE — G0423 INTENS CARDIAC REHAB NO EXER: HCPCS | Mod: 59 | Performed by: FAMILY MEDICINE

## 2018-09-17 PROCEDURE — G0422 INTENS CARDIAC REHAB W/EXERC: HCPCS | Performed by: FAMILY MEDICINE

## 2018-09-17 NOTE — PROGRESS NOTES
Cardiac Rehab Individual Treatment Plan Assessment: Other (see comment) (120 day) 18 Session #     22   MRN: [id number]   Allergies: Morphine   Patient Name: Hector Hanson : 1955 Risk Stratification: High    Diagnoses:   1. History of heart artery stent     Age: 63 y.o. Physician: Anupam Merritt M.D.    Date of Event: 18 Specialist: Amos ([facility])   Risk Factors:  Hypertension, Hyperlipidemia, Obesity, Stress, Age, Male > 45   Exercise Nutrition Education Psychosocial   Stages of change Stages of change Stages of change Stages of change   Preparation Preparation Preparation Preparation   Fitness Test Lipids Learning Barriers Outcome Survey Tools   DIST:  (assessed pre- and post-program)  Available: No new (Pt has had recent labs. Attempting to obtain.) Learning Barriers: Ready to Learn FP QOL Overall Score:  (assessed pre and post program)   Max HR:  (assessed pre- and post-program) Date: 11 Family Support PHQ-9:  (assessed pre and post program)   RPE:  (assessed pre- and post-program) Total: 172 mg/dL Yes Nutrition Screen:  (assessed pre and post program)   SPO2:  (assessed pre- and post-program) Tri mg/dL Lives: Spouse Intervention   MET:  (assessed pre- and post-program) HDL: 27 mg/dL Tobacco Use Behavioral Health Consult: N/A   EF= 65 LDL: 89 mg/dL History   Smoking Status   • Former Smoker   • Packs/day: 1.50   • Years: 13.00   • Types: Cigarettes, Cigars   • Quit date: 1981   Smokeless Tobacco   • Never Used      Physician Referral: No   Ambulatory Status Diabetes Smoking Intervention Identifies Stressors: Yes   Fall Risk Assessed: Yes Diabetes: No Smoking Cessation Referral: N/A Drug Intervention: No   Exercise Ambulatory Status Assist Devices: None HbA1C:  (n/a) Date:  (not available) Ind. Education / Counseling: N/A Education   Exercise Prescription Monitors BS at Home: No Tobacco Adjunct: N/A Psychosocial Education: Coping Techniques, Positive  February 7, 2018     Patient: Delores Ha   YOB: 2008   Date of Visit: 2/7/2018       To Whom it May Concern:    Delores Ha is under my professional care  He was seen in my office on 2/7/2018  He may return to school on 2/9/2018 excused 2/6-2/9/2018  If you have any questions or concerns, please don't hesitate to call           Sincerely,          Sandie Estrada DO        CC: No Recipients "Support System, S/S Depression   Mode: Treadmill, NuStep, UBE, Airdyne   Frequency:  (n/a) Random BS:  (n/a) Education Intervention Target Goal   Frequency:  2 days/week Weight Management Healthy Heart Education: Cooking School Attended, Dietician One-on-One Meeting Attended, Medications Reviewed, Patient Education Binder Provided, Risk Factors Discussed, Videos Viewed per Huma, Workshops Attended Assess presence or absence of depression using a valid screening: Yes   Duration:  30-45 minutes Weight: 100.2 kg (221 lb) Target Goal Use Stress Management: Yes   Intensity:  11-13 RPE Height: 175.3 cm (5' 9.02\") Complete Tobacco Cessation: Complete Tobacco Cessation: N/A Adverse Events: No   METS:  Other (3.0-5.5 METS) BMI (Calculated): 32.62 Educate / Review and have understanding of cardiac disease prevention: Educate / Review and have understanding of cardiac disease prevention: Yes Unexpected Events: No   Progression:  ^ increments of 1-3 to THR/RPE as tolerated Goal weight: 200 Medication Compliance: Yes    THR: THR: Other (see comment) (113-123) History   Alcohol Use   • Yes     Comment: 2-3 drinks a week                        2 cigars a month, none anymore either         Angina with Exercise?  Angina with Exercise: No      Resistance Training?  Resistance Training: Yes      Hypertension      Diagnosed with HTN: Yes Intervention      Resting BP: 138/71 Dietician Consult/Class: Yes      Peak Ex BP:  (assessed pre- and post-program) Nurse/Patient Discussion: Yes     Intervention Diabetes Ed Referral: N/A     Home Exercise:  Yes Discuss Maintenance /Wt Loss: Yes     Mode: Walk Attend Cooking School: Yes     Duration: 20-30 mins Dietary Goal: >=58%Rate Your Plate     Frequency: 7 days active Education     Education Nutrition Education: Healthy Eating, Sodium Reduction     Exercise Safety, S/S to Report, RPE Scale, Warm Up / Cool Down, Physically Active Target Goal     Target Goal LDL-C < 100 if trig. > 200:  Yes   " "    Start Individual Exercise Rx Yes LDL-C < 70 for high risk patient:  Yes       BP < 140/90 or < 130/80 if DM or CKD Yes Non HDL-C Should be < 130:  Yes       Aerobic activity 30 + min / day 5 days / wk Yes HbA1C < 7%: N/A         BMI < 25: Yes       Notes: Exercise: Current: Hector is completing 30 minutes of sustained aerobic exercise in [facility]. Because he likes the increased challenge of the Airdyne but is unable to complete 30 minutes of sustained activity on it quite yet, he will complete 10 minutes on the Airdyne and then switch to the treadmill for the remaining 20 minutes of exercise. His METS are 5.3 on the treadmill. He is completing an Advanced weight routine using 9-12lb dumbbells. Goal: To incorporate more physical activity outside of [facility]. To go to the pool once this week and walk in the water for 20 minutes. Progress: Hector reports that he does feel increased stamina and strength since beginning the program. Nutrition: Current: Hector states \"I'm not doing as good as I could be doing, but I'm doing alright.\" He states his challenges are portion sizes and he has had five 4-6oz glasses of wine this week as opposed to his general 2 or 3. Meals are often with family discussing business and he states he is not as aware of how much food he is eating. Hector is a  and does most of the meal prep for the family. He says this is a benefit as he is able to control how his food is prepared. He is doing well incorporating whole grains, beans/legumes, and vegetables into his diet. Hector states he has had recent labs drawn. RN will attempt to obtain and review these. Goal: To measure portion sizes for three days and assess when he wants to eat more whether it is from true hunger or because of stress or emotion. Progress: Hector is incorporating heart-healthy foods into his diet. Stress: Current: Hector states he is under a lot of stress currently trying to manage family affairs with the sale of his mother's property. He " "states \"I just can't escape my phone!\" He states \"I'm a person who likes to do it myself because then I know it's done and done well.\" Goal: To take 10 deep breaths when he notices his BP and HR elevating. Progress: Hector is identifying his stressors, has family support, and is open to talking and learning ways to manage stress.                          "

## 2018-09-18 NOTE — PROGRESS NOTES
Hector Hanson attended: Exercise Workshop from 3:30-4:30pm.      The topic was: Improving Performance.     Patient received handouts regarding the specific exercise information.

## 2018-09-28 ENCOUNTER — NON-PROVIDER VISIT (OUTPATIENT)
Dept: CARDIOLOGY | Facility: MEDICAL CENTER | Age: 63
End: 2018-09-28
Payer: COMMERCIAL

## 2018-09-28 DIAGNOSIS — Z95.5 HISTORY OF HEART ARTERY STENT: ICD-10-CM

## 2018-09-28 LAB — EKG IMPRESSION: NORMAL

## 2018-09-28 PROCEDURE — G0422 INTENS CARDIAC REHAB W/EXERC: HCPCS | Performed by: FAMILY MEDICINE

## 2018-09-28 PROCEDURE — G0423 INTENS CARDIAC REHAB NO EXER: HCPCS | Mod: 59 | Performed by: FAMILY MEDICINE

## 2018-09-28 NOTE — PROGRESS NOTES
Hector Hanson attended: cooking school from  3:30-4:30pm.   Today  prepared Chickpea Quinoa Dressing with [place] Dressing.    Patient received handouts and nutrition information regarding the specific recipes.

## 2018-10-05 ENCOUNTER — NON-PROVIDER VISIT (OUTPATIENT)
Dept: CARDIOLOGY | Facility: MEDICAL CENTER | Age: 63
End: 2018-10-05
Payer: COMMERCIAL

## 2018-10-05 DIAGNOSIS — Z95.5 HISTORY OF HEART ARTERY STENT: ICD-10-CM

## 2018-10-05 LAB — EKG IMPRESSION: NORMAL

## 2018-10-05 PROCEDURE — G0423 INTENS CARDIAC REHAB NO EXER: HCPCS | Mod: 59 | Performed by: FAMILY MEDICINE

## 2018-10-05 PROCEDURE — G0422 INTENS CARDIAC REHAB W/EXERC: HCPCS | Performed by: FAMILY MEDICINE

## 2018-10-05 NOTE — PROGRESS NOTES
Hector Hanson attended: cooking school from  3:30-4:30pm.   Today  prepared Pumpkin Ice Cream and Apple Crisp.    Patient received handouts and nutrition information regarding the specific recipes.

## 2018-10-08 ENCOUNTER — NON-PROVIDER VISIT (OUTPATIENT)
Dept: CARDIOLOGY | Facility: MEDICAL CENTER | Age: 63
End: 2018-10-08
Payer: COMMERCIAL

## 2018-10-08 DIAGNOSIS — Z95.5 HISTORY OF HEART ARTERY STENT: ICD-10-CM

## 2018-10-08 LAB — EKG IMPRESSION: NORMAL

## 2018-10-08 PROCEDURE — G0422 INTENS CARDIAC REHAB W/EXERC: HCPCS | Performed by: FAMILY MEDICINE

## 2018-10-08 PROCEDURE — G0423 INTENS CARDIAC REHAB NO EXER: HCPCS | Mod: 59 | Performed by: FAMILY MEDICINE

## 2018-10-15 ENCOUNTER — NON-PROVIDER VISIT (OUTPATIENT)
Dept: CARDIOLOGY | Facility: MEDICAL CENTER | Age: 63
End: 2018-10-15
Payer: COMMERCIAL

## 2018-10-15 DIAGNOSIS — Z95.5 HISTORY OF HEART ARTERY STENT: ICD-10-CM

## 2018-10-15 LAB — EKG IMPRESSION: NORMAL

## 2018-10-15 PROCEDURE — G0422 INTENS CARDIAC REHAB W/EXERC: HCPCS | Performed by: FAMILY MEDICINE

## 2018-10-15 PROCEDURE — G0423 INTENS CARDIAC REHAB NO EXER: HCPCS | Mod: 59 | Performed by: FAMILY MEDICINE

## 2018-10-16 NOTE — PROGRESS NOTES
Hector Hanson attended the following workshop from 3:30-4:30pm.  Workshop Title: New Thoughts, New Behaviors.      Lecture was attended and patient questions addressed. The patient will continue workshops and nutrition education.

## 2018-10-16 NOTE — PROGRESS NOTES
Cardiac Rehab Individual Treatment Plan Assessment: Other (see comment) (150) 10/15/18 Session #     26   MRN: [id number]   Allergies: Morphine   Patient Name: Hector Hanson : 1955 Risk Stratification: High    Diagnoses:   1. History of heart artery stent     Age: 63 y.o. Physician: Anupam Merritt M.D.    Date of Event: 18 Specialist: Amos ([facility])   Risk Factors:  Hypertension, Hyperlipidemia, Obesity, Stress, Age, Male > 45   Exercise Nutrition Education Psychosocial   Stages of change Stages of change Stages of change Stages of change   Preparation Preparation Preparation Preparation   Fitness Test Lipids Learning Barriers Outcome Survey Tools   DIST:  (assessed pre- and post-program)  Available: No new (Pt has had recent labs. Attempting to obtain.) Learning Barriers: Ready to Learn FP QOL Overall Score:  (assessed pre and post program)   Max HR:  (assessed pre- and post-program) Date: 11 Family Support PHQ-9:  (assessed pre and post program)   RPE:  (assessed pre- and post-program) Total: 172 mg/dL Yes Nutrition Screen:  (assessed pre and post program)   SPO2:  (assessed pre- and post-program) Tri mg/dL Lives: Spouse Intervention   MET:  (assessed pre- and post-program) HDL: 27 mg/dL Tobacco Use Behavioral Health Consult: N/A   EF= 65 LDL: 89 mg/dL History   Smoking Status   • Former Smoker   • Packs/day: 1.50   • Years: 13.00   • Types: Cigarettes, Cigars   • Quit date: 1981   Smokeless Tobacco   • Never Used      Physician Referral: No   Ambulatory Status Diabetes Smoking Intervention Identifies Stressors: Yes   Fall Risk Assessed: Yes Diabetes: No Smoking Cessation Referral: N/A Drug Intervention: No   Exercise Ambulatory Status Assist Devices: None HbA1C:  (n/a) Date:  (not available) Ind. Education / Counseling: N/A Education   Exercise Prescription Monitors BS at Home: No Tobacco Adjunct: N/A Psychosocial Education: Coping Techniques, Positive Support  "System, S/S Depression   Mode: Treadmill, NuStep, UBE, Airdyne   Frequency:  (n/a) Random BS:  (n/a) Education Intervention Target Goal   Frequency:  2 days/week Weight Management Healthy Heart Education: Class Schedule Given, Cooking School Attended, Dietician One-on-One Meeting Attended, Medications Reviewed, Patient Education Binder Provided, Risk Factors Discussed, Videos Viewed per Huma, Workshops Attended Assess presence or absence of depression using a valid screening: Yes   Duration:  30-45 minutes Weight: 99.8 kg (220 lb) Target Goal Use Stress Management: Yes   Intensity:  11-13 RPE Height: 175.3 cm (5' 9.02\") Complete Tobacco Cessation: Complete Tobacco Cessation: N/A Adverse Events: No   METS:  Other (3.0-5.5 METS) BMI (Calculated): 32.47 Educate / Review and have understanding of cardiac disease prevention: Educate / Review and have understanding of cardiac disease prevention: Yes Unexpected Events: No   Progression:  ^ increments of 1-3 to THR/RPE as tolerated Goal weight: 200 Medication Compliance: Yes    THR: THR: Other (see comment) (113-123) History   Alcohol Use   • Yes     Comment: 2-3 drinks a week                        2 cigars a month, none anymore either         Angina with Exercise?  Angina with Exercise: No      Resistance Training?  Resistance Training: Yes      Hypertension      Diagnosed with HTN: Yes Intervention      Resting BP: 136/73 Dietician Consult/Class: Yes      Peak Ex BP:  (assessed pre- and post-program) Nurse/Patient Discussion: Yes     Intervention Diabetes Ed Referral: N/A     Home Exercise:  Yes Discuss Maintenance /Wt Loss: Yes     Mode: Walk, Gym, Other (home weights, pool) Attend Cooking School: Yes     Duration: 30-40min Dietary Goal: >=58%Rate Your Plate     Frequency: 7 days active Education     Education Nutrition Education: Healthy Eating, Sodium Reduction     Exercise Safety, S/S to Report, RPE Scale, Warm Up / Cool Down, Physically Active Target Goal   " "  Target Goal LDL-C < 100 if trig. > 200:  Yes       Start Individual Exercise Rx Yes LDL-C < 70 for high risk patient:  Yes       BP < 140/90 or < 130/80 if DM or CKD Yes Non HDL-C Should be < 130:  Yes       Aerobic activity 30 + min / day 5 days / wk Yes HbA1C < 7%: N/A         BMI < 25: Yes       Notes: Exercise: Current: Hector is completing one aerobic exercise for 30 minutes in [facility], plus an Advanced weight routine using 9 lb. dumbbells. On the treadmill he is walking at 2.7mph, 6% grade, 5.3 METS. At home he is walking 30-40 minutes once or twice daily and completing a weight routine at home or at the gym. Goal: He has gone to the pool a few times and would like to go more. Now that a major obligation has finalized, he feels that he will be able to focus more time each week to his exercise and self-care. He stated he has a goal to lose 10 lbs. by mid-November. Progress: Hector has started a home weight training routine. Nutrition: Current: Hector states that he is trying to be more consistent with his fruit consumption and snacking on fruit as opposed to other things. Goal: To snack on a piece of fruit before considering another snack option. Progress: Hector is conscious of heart healthy options and is working to incorporate them into his routine on a daily basis. Stress: Current: Hector states his stress has been \"through the roof\" preparing his mother's house for sale. He states that the house just closed last week and he feels tremendous relief. He is now focusing his time and energy on his nutrition and exercise. Goal: To focus more time on self-care and attend Healthy Mindset classes. Progress: Hector identifies his stressors, but states he is a person who wants to take control of a task and follow it through to make sure it's done right. He recognizes that his health is often compromised in the process. He continues to attend the Healthy Mindset classes to learn healthier ways of coping with stress.                "

## 2018-10-22 ENCOUNTER — NON-PROVIDER VISIT (OUTPATIENT)
Dept: CARDIOLOGY | Facility: MEDICAL CENTER | Age: 63
End: 2018-10-22
Payer: COMMERCIAL

## 2018-10-22 DIAGNOSIS — Z95.5 HISTORY OF HEART ARTERY STENT: ICD-10-CM

## 2018-10-22 LAB — EKG IMPRESSION: NORMAL

## 2018-10-22 PROCEDURE — G0422 INTENS CARDIAC REHAB W/EXERC: HCPCS | Performed by: FAMILY MEDICINE

## 2018-10-22 PROCEDURE — G0423 INTENS CARDIAC REHAB NO EXER: HCPCS | Mod: 59 | Performed by: FAMILY MEDICINE

## 2018-10-22 NOTE — PROGRESS NOTES
Hector Hanson attended: Exercise Workshop from 3:30-4:30pm.       The topic was: Resistance.     Patient received handouts regarding the specific exercise information.

## 2018-10-29 ENCOUNTER — NON-PROVIDER VISIT (OUTPATIENT)
Dept: CARDIOLOGY | Facility: MEDICAL CENTER | Age: 63
End: 2018-10-29
Payer: COMMERCIAL

## 2018-10-29 DIAGNOSIS — Z95.5 HISTORY OF HEART ARTERY STENT: ICD-10-CM

## 2018-10-29 LAB — EKG IMPRESSION: NORMAL

## 2018-10-29 PROCEDURE — G0423 INTENS CARDIAC REHAB NO EXER: HCPCS | Mod: 59 | Performed by: FAMILY MEDICINE

## 2018-10-29 PROCEDURE — G0422 INTENS CARDIAC REHAB W/EXERC: HCPCS | Performed by: FAMILY MEDICINE

## 2018-10-29 NOTE — PROGRESS NOTES
Hector Hansno attended the following workshop from 2:30-3:30pm.   Workshop Title: Fueling a Heathy Body.       Lecture was attended and patient questions addressed. The patient will continue workshops and nutrition education.

## 2018-10-30 NOTE — PROGRESS NOTES
Cardiac Rehab Individual Treatment Plan Assessment: Other (see comment) (180) 10/29/18 Session #     28   MRN: [id number]   Allergies: Morphine   Patient Name: Hector Hanson : 1955 Risk Stratification: High    Diagnoses:   1. History of heart artery stent     Age: 63 y.o. Physician: Anupam Merritt M.D.    Date of Event: 18 Specialist: Amos ([facility])   Risk Factors:  Hypertension, Hyperlipidemia, Obesity, Stress, Age, Male > 45   Exercise Nutrition Education Psychosocial   Stages of change Stages of change Stages of change Stages of change   Preparation Preparation Preparation Preparation   Fitness Test Lipids Learning Barriers Outcome Survey Tools   DIST:  (assessed pre- and post-program)  Available: Yes Learning Barriers: None FP QOL Overall Score:  (assessed pre and post program)   Max HR:  (assessed pre- and post-program) Date:  (10/2018) Family Support PHQ-9:  (assessed pre and post program)   RPE:  (assessed pre- and post-program) Total: 168 mg/dL Yes Nutrition Screen:  (assessed pre and post program)   SPO2:  (assessed pre- and post-program) Tri mg/dL Lives: Spouse Intervention   MET:  (assessed pre- and post-program) HDL: 35 mg/dL Tobacco Use Behavioral Health Consult: N/A   EF= 65 LDL: 57 mg/dL History   Smoking Status   • Former Smoker   • Packs/day: 1.50   • Years: 13.00   • Types: Cigarettes, Cigars   • Quit date: 1981   Smokeless Tobacco   • Never Used      Physician Referral: No   Ambulatory Status Diabetes Smoking Intervention Identifies Stressors: Yes   Fall Risk Assessed: Yes Diabetes: No Smoking Cessation Referral: N/A Drug Intervention: No   Exercise Ambulatory Status Assist Devices: None HbA1C:  (n/a) Date:  (not available) Ind. Education / Counseling: N/A Education   Exercise Prescription Monitors BS at Home: No Tobacco Adjunct: N/A Psychosocial Education: Coping Techniques, Positive Support System, S/S Depression   Mode: Treadmill, NuStep, UBE,  "eder   Frequency:  (n/a) Random BS: 105 (10/2018) Education Intervention Target Goal   Frequency:  2 days/week Weight Management Healthy Heart Education: Class Schedule Given, Cooking School Attended, Dietician One-on-One Meeting Attended, Medications Reviewed, Patient Education Binder Provided, Risk Factors Discussed, Videos Viewed per Huma, Workshops Attended Assess presence or absence of depression using a valid screening: Yes   Duration:  30-45 minutes Weight: 99.3 kg (219 lb) Target Goal Use Stress Management: Yes   Intensity:  11-13 RPE Height: 175.3 cm (5' 9.02\") Complete Tobacco Cessation: Complete Tobacco Cessation: N/A Adverse Events: No   METS:  Other (3.0-5.5 METS) BMI (Calculated): 32.33 Educate / Review and have understanding of cardiac disease prevention: Educate / Review and have understanding of cardiac disease prevention: Yes Unexpected Events: No   Progression:  ^ increments of 1-3 to THR/RPE as tolerated Goal weight: 200 Medication Compliance: Yes    THR: THR: Other (see comment) (113-123) History   Alcohol Use   • Yes     Comment: 2-3 drinks a week                        2 cigars a month, none anymore either         Angina with Exercise?  Angina with Exercise: No      Resistance Training?  Resistance Training: Yes      Hypertension      Diagnosed with HTN: Yes Intervention      Resting BP: 135/72 Dietician Consult/Class: Yes      Peak Ex BP:  (assessed pre- and post-program) Nurse/Patient Discussion: Yes     Intervention Diabetes Ed Referral: N/A     Home Exercise:  Yes Discuss Maintenance /Wt Loss: Yes     Mode: Walk, Gym, Other (home weights, pool) Attend Cooking School: Yes     Duration: 30-60min Dietary Goal: >=58%Rate Your Plate     Frequency: 7 days active Education     Education Nutrition Education: Healthy Eating, Sodium Reduction     Exercise Safety, S/S to Report, RPE Scale, Warm Up / Cool Down, Physically Active Target Goal     Target Goal LDL-C < 100 if trig. > 200:  Yes     "   Start Individual Exercise Rx Yes LDL-C < 70 for high risk patient:  Yes       BP < 140/90 or < 130/80 if DM or CKD Yes Non HDL-C Should be < 130:  Yes       Aerobic activity 30 + min / day 5 days / wk Yes HbA1C < 7%: N/A         BMI < 25: Yes       Notes: Exercise: Current: Hector is completing one aerobic exercise for 30 minutes in [facility], plus an Advanced weight routine using 9 lb. dumbbells. On the treadmill he is walking at 2.7mph, 6% grade, 5.3 METS. At home he is walking 30-40 minutes once or twice daily, going to the pool Monday, Wednesday, and Friday, and completing a weight routine on Tuesday and Thursday. Goal: Hector has set a goal to complete the above exercise routine weekly. He stated he has a goal to lose 10 lbs. by mid-November. Progress: Hector has met his previous goal of beginning a home weight training program and swimming at the pool. Nutrition: Current: Hector states that he is trying to be more consistent with his fruit consumption and snacking on fruit as opposed to other things. He states he eats a lot of beans and vegetables. He cooks for the family and events and is anticipating the holiday season will be challenging around food. His wine consumption was three glasses as opposed to 6 glasses total over the course of the week which is an improvement. Goal: To continue to focus on heart-healthy options. To lose 10lbs. Progress: Hector is conscious of heart healthy options and is working to incorporate them into his routine on a daily basis. Stress: Current: Hector states his stress has lifted lately and he is now focusing his time and energy on his nutrition and exercise. Goal: To focus more time on self-care and attend Healthy Mindset classes. Progress: Hector identifies his stressors, but states he is a person who wants to take control of a task and follow it through to make sure it's done right. He recognizes that his health is often compromised in the process. He continues to attend the Healthy Mindset  classes to learn healthier ways of coping with stress.

## 2018-11-09 ENCOUNTER — NON-PROVIDER VISIT (OUTPATIENT)
Dept: CARDIOLOGY | Facility: MEDICAL CENTER | Age: 63
End: 2018-11-09
Payer: COMMERCIAL

## 2018-11-09 DIAGNOSIS — Z95.5 HISTORY OF HEART ARTERY STENT: ICD-10-CM

## 2018-11-09 LAB — EKG IMPRESSION: NORMAL

## 2018-11-09 PROCEDURE — G0422 INTENS CARDIAC REHAB W/EXERC: HCPCS | Performed by: FAMILY MEDICINE

## 2018-11-09 PROCEDURE — G0423 INTENS CARDIAC REHAB NO EXER: HCPCS | Mod: 59 | Performed by: FAMILY MEDICINE

## 2018-11-10 NOTE — PROGRESS NOTES
Hector Hanson attended: cooking school from  3:30-4:30pm.   Today  prepared [place] Squash and Quinoa Salad.      Patient received handouts and nutrition information regarding the specific recipes.

## 2018-11-12 ENCOUNTER — NON-PROVIDER VISIT (OUTPATIENT)
Dept: CARDIOLOGY | Facility: MEDICAL CENTER | Age: 63
End: 2018-11-12
Payer: COMMERCIAL

## 2018-11-12 DIAGNOSIS — Z95.5 HISTORY OF HEART ARTERY STENT: ICD-10-CM

## 2018-11-12 LAB — EKG IMPRESSION: NORMAL

## 2018-11-12 PROCEDURE — G0422 INTENS CARDIAC REHAB W/EXERC: HCPCS | Performed by: FAMILY MEDICINE

## 2018-11-12 PROCEDURE — G0423 INTENS CARDIAC REHAB NO EXER: HCPCS | Mod: 59 | Performed by: FAMILY MEDICINE

## 2018-11-16 ENCOUNTER — NON-PROVIDER VISIT (OUTPATIENT)
Dept: CARDIOLOGY | Facility: MEDICAL CENTER | Age: 63
End: 2018-11-16
Payer: COMMERCIAL

## 2018-11-16 DIAGNOSIS — Z95.5 HISTORY OF HEART ARTERY STENT: ICD-10-CM

## 2018-11-16 LAB — EKG IMPRESSION: NORMAL

## 2018-11-16 PROCEDURE — G0423 INTENS CARDIAC REHAB NO EXER: HCPCS | Mod: 59 | Performed by: FAMILY MEDICINE

## 2018-11-16 PROCEDURE — G0422 INTENS CARDIAC REHAB W/EXERC: HCPCS | Performed by: FAMILY MEDICINE

## 2018-11-17 NOTE — PROGRESS NOTES
Hector Hanson attended: cooking school from  3:30-4:30pm.   Today  prepared Oatmeal Buffet Bar.     Patient received handouts and nutrition information regarding the specific recipes.

## 2018-11-19 ENCOUNTER — NON-PROVIDER VISIT (OUTPATIENT)
Dept: CARDIOLOGY | Facility: MEDICAL CENTER | Age: 63
End: 2018-11-19
Payer: COMMERCIAL

## 2018-11-19 DIAGNOSIS — Z95.5 HISTORY OF HEART ARTERY STENT: ICD-10-CM

## 2018-11-19 LAB — EKG IMPRESSION: NORMAL

## 2018-11-19 PROCEDURE — G0423 INTENS CARDIAC REHAB NO EXER: HCPCS | Mod: 59 | Performed by: FAMILY MEDICINE

## 2018-11-19 PROCEDURE — G0422 INTENS CARDIAC REHAB W/EXERC: HCPCS | Performed by: FAMILY MEDICINE

## 2018-11-20 ENCOUNTER — NON-PROVIDER VISIT (OUTPATIENT)
Dept: CARDIOLOGY | Facility: MEDICAL CENTER | Age: 63
End: 2018-11-20
Payer: COMMERCIAL

## 2018-11-20 DIAGNOSIS — Z95.5 HISTORY OF HEART ARTERY STENT: ICD-10-CM

## 2018-11-20 LAB — EKG IMPRESSION: NORMAL

## 2018-11-20 PROCEDURE — G0423 INTENS CARDIAC REHAB NO EXER: HCPCS | Mod: 59 | Performed by: FAMILY MEDICINE

## 2018-11-20 PROCEDURE — G0422 INTENS CARDIAC REHAB W/EXERC: HCPCS | Performed by: FAMILY MEDICINE

## 2018-11-20 NOTE — PROGRESS NOTES
Cardiac Rehab Individual Treatment Plan Assessment: (P) Other (see comment) (210 day ITP) 18 Session #     P) 78   MRN: [id number]   Allergies: Morphine   Patient Name: Hector Hanson : 1955 Risk Stratification: (P) High    Diagnoses:   1. History of heart artery stent     Age: 63 y.o. Physician: Anupam Merritt M.D.    Date of Event: (P) 18 Specialist: Trinidad (P) ([facility])   Risk Factors:  (P) Hypertension, Hyperlipidemia, Obesity, Stress, Age, Male > 45   Exercise Nutrition Education Psychosocial   Stages of change Stages of change Stages of change Stages of change   (P) Preparation (P) Preparation (P) Preparation (P) Preparation   Fitness Test Lipids Learning Barriers Outcome Survey Tools   DIST: (P)  (assessed pre- and post-program)  Available: (P) Yes Learning Barriers: (P) None FP QOL Overall Score: (P)  (assessed pre and post program)   Max HR: (P)  (assessed pre- and post-program) Date: (P)  (10/2018) Family Support PHQ-9: (P)  (assessed pre and post program)   RPE: (P)  (assessed pre- and post-program) Total: (P) 168 mg/dL (P) Yes Nutrition Screen: (P)  (assessed pre and post program)   SPO2: (P)  (assessed pre- and post-program) Trig: (P) 382 mg/dL Lives: (P) Spouse Intervention   MET: (P)  (assessed pre- and post-program) HDL: (P) 35 mg/dL Tobacco Use Behavioral Health Consult: (P) N/A   EF= (P) 65 LDL: (P) 57 mg/dL History   Smoking Status   • Former Smoker   • Packs/day: 1.50   • Years: 13.00   • Types: Cigarettes, Cigars   • Quit date: 1981   Smokeless Tobacco   • Never Used      Physician Referral: (P) No   Ambulatory Status Diabetes Smoking Intervention Identifies Stressors: (P) Yes   Fall Risk Assessed: (P) Yes Diabetes: (P) No Smoking Cessation Referral: (P) N/A Drug Intervention: (P) No   Exercise Ambulatory Status Assist Devices: (P) None HbA1C: (P)  (n/a) Date: (P)  (not available) Ind. Education / Counseling: (P) N/A Education   Exercise Prescription  "Monitors BS at Home: (P) No Tobacco Adjunct: (P) N/A Psychosocial Education: (P) Coping Techniques, Positive Support System, S/S Depression   Mode: (P) Treadmill, NuStep, UBE, Airdyne   Frequency: (P)  (n/a) Random BS: (P) 105 (10/2018) Education Intervention Target Goal   Frequency:  (P) 2 days/week Weight Management Healthy Heart Education: (P) Class Schedule Given, Cooking School Attended, Dietician One-on-One Meeting Attended, Medications Reviewed, Patient Education Binder Provided, Risk Factors Discussed, Videos Viewed per Huma, Workshops Attended Assess presence or absence of depression using a valid screening: (P) Yes   Duration:  (P) 30-45 minutes Weight: (P) 100.2 kg (221 lb) Target Goal Use Stress Management: (P) Yes   Intensity:  (P) 11-13 RPE Height: (P) 175.3 cm (5' 9.02\") Complete Tobacco Cessation: Complete Tobacco Cessation: (P) N/A Adverse Events: (P) No   METS:  (P) Other (3.0-5.5 METS) BMI (Calculated): (P) 32.62 Educate / Review and have understanding of cardiac disease prevention: Educate / Review and have understanding of cardiac disease prevention: (P) Yes Unexpected Events: (P) No   Progression:  (P) ^ increments of 1-3 to THR/RPE as tolerated Goal weight: (P) 200 Medication Compliance: (P) Yes    THR: THR: (P) Other (see comment) (113-123) History   Alcohol Use   • Yes     Comment: 2-3 drinks a week                        2 cigars a month, none anymore either         Angina with Exercise?  Angina with Exercise: (P) No      Resistance Training?  Resistance Training: (P) Yes      Hypertension      Diagnosed with HTN: (P) Yes Intervention      Resting BP: (P) 137/64 Dietician Consult/Class: (P) Yes      Peak Ex BP: (P)  (assessed pre- and post-program) Nurse/Patient Discussion: (P) Yes     Intervention Diabetes Ed Referral: (P) N/A     Home Exercise:  (P) Yes Discuss Maintenance /Wt Loss: (P) Yes     Mode: (P) Walk, Gym, Other (home weights, pool) Attend Cooking School: (P) Yes   "   Duration: (P) 30-60min Dietary Goal: (P) >=58%Rate Your Plate     Frequency: (P) 7 days active Education     Education Nutrition Education: (P) Healthy Eating, Sodium Reduction     (P) Exercise Safety, S/S to Report, RPE Scale, Warm Up / Cool Down, Physically Active Target Goal     Target Goal LDL-C < 100 if trig. > 200:  (P) Yes       Start Individual Exercise Rx (P) Yes LDL-C < 70 for high risk patient:  (P) Yes       BP < 140/90 or < 130/80 if DM or CKD (P) Yes Non HDL-C Should be < 130:  (P) Yes       Aerobic activity 30 + min / day 5 days / wk (P) Yes HbA1C < 7%: (P) N/A         BMI < 25: (P) Yes       Notes:Hector is on his 32nd session today. Hector is preparing to graduate on 11/30.    Exercise: Hector is continuing to complete 1x 30 minutes of exercise while at [facility]. While at home Hector is completing 30-40 minutes of aerobics (walking/ swimming).     Goal: Hector has a goal to start being active consistently 6 days a week.     Nutrition:   Hector says he has been doing well with his diet for the most part. Hector does admit he has been eating too many nuts. Goal: Hector has a goal to start eating a vegetarian meal 1x day a week.     Stress: Hector says he doesn't have much stress since his [place] house sold. Hector struggles with stress at times so this is good news. Hector admits that if he sticks to exercising and eating well he feels better prepared to deal with stress.     New goal: Hector has a goal to exercise 6 days a week to help combat stress

## 2018-11-28 ENCOUNTER — NON-PROVIDER VISIT (OUTPATIENT)
Dept: CARDIOLOGY | Facility: MEDICAL CENTER | Age: 63
End: 2018-11-28
Payer: COMMERCIAL

## 2018-11-28 DIAGNOSIS — Z95.5 HISTORY OF HEART ARTERY STENT: ICD-10-CM

## 2018-11-28 LAB — EKG IMPRESSION: NORMAL

## 2018-11-28 PROCEDURE — G0423 INTENS CARDIAC REHAB NO EXER: HCPCS | Mod: 59 | Performed by: FAMILY MEDICINE

## 2018-11-28 PROCEDURE — G0422 INTENS CARDIAC REHAB W/EXERC: HCPCS | Performed by: FAMILY MEDICINE

## 2018-11-28 ASSESSMENT — PATIENT HEALTH QUESTIONNAIRE - PHQ9
6. FEELING BAD ABOUT YOURSELF - OR THAT YOU ARE A FAILURE OR HAVE LET YOURSELF OR YOUR FAMILY DOWN: 1
3. TROUBLE FALLING OR STAYING ASLEEP OR SLEEPING TOO MUCH: 3
9. THOUGHTS THAT YOU WOULD BE BETTER OFF DEAD, OR OF HURTING YOURSELF: 0
2. FEELING DOWN, DEPRESSED, IRRITABLE, OR HOPELESS: 0
1. LITTLE INTEREST OR PLEASURE IN DOING THINGS: 0
SUM OF ALL RESPONSES TO PHQ QUESTIONS 1-9: 7
4. FEELING TIRED OR HAVING LITTLE ENERGY: 2
SUM OF ALL RESPONSES TO PHQ9 QUESTIONS 1 AND 2: 0
5. POOR APPETITE OR OVEREATING: 1
7. TROUBLE CONCENTRATING ON THINGS, SUCH AS READING THE NEWSPAPER OR WATCHING TELEVISION: 0
SUM OF ALL RESPONSES TO PHQ QUESTIONS 1-9: 7
8. MOVING OR SPEAKING SO SLOWLY THAT OTHER PEOPLE COULD HAVE NOTICED. OR THE OPPOSITE, BEING SO FIGETY OR RESTLESS THAT YOU HAVE BEEN MOVING AROUND A LOT MORE THAN USUAL: 0

## 2018-11-29 NOTE — PROGRESS NOTES
Cardiac Rehab Individual Treatment Plan Assessment: Discharge (Graduation) 18 Session #     34   MRN: [id number]   Allergies: Morphine   Patient Name: Hector Hanson : 1955 Risk Stratification: High    Diagnoses:   1. History of heart artery stent     Age: 63 y.o. Physician: Anupam Merritt M.D.    Date of Event: 18 Specialist: Amos ([facility])   Risk Factors:  Hypertension, Hyperlipidemia, Obesity, Stress, Age, Male > 45   Exercise Nutrition Education Psychosocial   Stages of change Stages of change Stages of change Stages of change   Action Action Action Action   Fitness Test Lipids Learning Barriers Outcome Survey Tools   DIST: 1525  Available: No new Learning Barriers: None FP QOL Overall Score: 17.91   Max HR: 102 Date:  (10/2018) Family Support PHQ-9: 7   RPE: 11 Total: 168 mg/dL Yes Nutrition Screen: 58 %   SPO2: 95 % Tri mg/dL Lives: Spouse Intervention   MET: 3.18 HDL: 35 mg/dL Tobacco Use Behavioral Health Consult: N/A   EF= 65 LDL: 57 mg/dL History   Smoking Status   • Former Smoker   • Packs/day: 1.50   • Years: 13.00   • Types: Cigarettes, Cigars   • Quit date: 1981   Smokeless Tobacco   • Never Used      Physician Referral: No   Ambulatory Status Diabetes Smoking Intervention Identifies Stressors: Yes   Fall Risk Assessed: Yes Diabetes: No Smoking Cessation Referral: N/A Drug Intervention: No   Exercise Ambulatory Status Assist Devices: None HbA1C:  (n/a) Date:  (not available) Ind. Education / Counseling: N/A Education   Exercise Prescription Monitors BS at Home: No Tobacco Adjunct: N/A Psychosocial Education: Coping Techniques, Positive Support System, S/S Depression   Mode: Treadmill, NuStep, UBE, Airdyne   Frequency:  (n/a) Random BS: 105 (10/2018) Education Intervention Target Goal   Frequency:  2 days/week Weight Management Healthy Heart Education: Class Schedule Given, Cooking School Attended, Dietician One-on-One Meeting Attended, Medications  "Reviewed, Patient Education Binder Provided, Risk Factors Discussed, Videos Viewed per Huma, Workshops Attended Assess presence or absence of depression using a valid screening: Yes   Duration:  30-45 minutes Weight: 101.2 kg (223 lb) Target Goal Use Stress Management: Yes   Intensity:  11-13 RPE Height: 175.3 cm (5' 9.02\") Complete Tobacco Cessation: Complete Tobacco Cessation: N/A Adverse Events: No   METS:  Other (3.0-5.5 METS) BMI (Calculated): 32.92 Educate / Review and have understanding of cardiac disease prevention: Educate / Review and have understanding of cardiac disease prevention: Yes Unexpected Events: No   Progression:  ^ increments of 1-3 to THR/RPE as tolerated Goal weight: 200 Medication Compliance: Yes    THR: THR: Other (see comment) (113-123) History   Alcohol Use   • Yes     Comment: 2-3 drinks a week                        2 cigars a month, none anymore either         Angina with Exercise?  Angina with Exercise: No      Resistance Training?  Resistance Training: Yes      Hypertension      Diagnosed with HTN: Yes Intervention      Resting BP: 151/77 Dietician Consult/Class: Yes      Peak Ex BP: 164/85 Nurse/Patient Discussion: Yes     Intervention Diabetes Ed Referral: N/A     Home Exercise:  Yes Discuss Maintenance /Wt Loss: Yes     Mode: Walk, Gym, Other (home weights, pool) Attend Cooking School: Yes     Duration: 30-60min Dietary Goal: >=58%Rate Your Plate     Frequency: 7 days active Education     Education Nutrition Education: Healthy Eating, Sodium Reduction     Exercise Safety, S/S to Report, RPE Scale, Warm Up / Cool Down, Physically Active Target Goal     Target Goal LDL-C < 100 if trig. > 200:  Yes       Start Individual Exercise Rx Yes LDL-C < 70 for high risk patient:  Yes       BP < 140/90 or < 130/80 if DM or CKD Yes Non HDL-C Should be < 130:  Yes       Aerobic activity 30 + min / day 5 days / wk Yes HbA1C < 7%: N/A         BMI < 25: Yes       Notes:    Hector Hanson is " "graduating today on his 34th session.     Exercise:  Hector currently does 30 minutes of exercise plus an advanced weight routine while at [facility]. When Hector is at home he walks for 30 minutes.     Grad goal: Hector has a gym membership at [employer] and has a goal to go there more on his own and complete 30 minutes or more for aerobics, swimming and weights. Hector will be coming to Phase III on Tuesdays and Thursdays.     Nutrition:   Hector has made some changes since starting at [facility]. Hector has stopped eating red meat, started eating more whole grains, vegetables, and fruit.     Grad goal: Hector has a goal to continue to lose weight after graduation. Hector will be adding in more meat-free days to his diet.     Stress:   Hector says he feels he has been doing pretty well with his stress. Hector says he feels that exercise and eating healthy help him feel better prepared to handle life stresses. Hector says he was thankful for the [employer] classes for managing stress. \"It was good to learn the impact stress has on your heart.\"     Grad goal: Hector has a goal to continue to exercise and eating a healthy diet to help manage stress.                                                                   "

## 2019-01-02 ENCOUNTER — HOSPITAL ENCOUNTER (OUTPATIENT)
Facility: MEDICAL CENTER | Age: 64
End: 2019-01-02
Attending: UROLOGY
Payer: COMMERCIAL

## 2019-01-02 PROCEDURE — 87086 URINE CULTURE/COLONY COUNT: CPT

## 2019-01-04 LAB
BACTERIA UR CULT: NORMAL
SIGNIFICANT IND 70042: NORMAL
SITE SITE: NORMAL
SOURCE SOURCE: NORMAL

## 2021-03-03 DIAGNOSIS — Z23 NEED FOR VACCINATION: ICD-10-CM

## 2021-06-16 PROBLEM — M54.2 CERVICALGIA: Status: ACTIVE | Noted: 2021-06-16

## 2021-06-16 PROBLEM — R52 INTRACTABLE PAIN: Status: ACTIVE | Noted: 2021-06-16

## 2021-06-16 PROBLEM — M54.12 CERVICAL RADICULITIS: Status: ACTIVE | Noted: 2021-06-16

## 2021-06-16 PROBLEM — M79.18 CERVICAL MYOFASCIAL PAIN SYNDROME: Status: ACTIVE | Noted: 2021-06-16

## 2021-06-16 PROBLEM — F11.90 OPIOID USE: Status: ACTIVE | Noted: 2021-06-16

## 2021-06-16 PROBLEM — M25.512 CHRONIC LEFT SHOULDER PAIN: Status: ACTIVE | Noted: 2021-06-16

## 2021-06-16 PROBLEM — G89.29 CHRONIC LEFT SHOULDER PAIN: Status: ACTIVE | Noted: 2021-06-16

## 2021-11-04 PROBLEM — M25.372 ANKLE INSTABILITY, LEFT: Status: ACTIVE | Noted: 2021-11-04

## 2023-12-07 PROBLEM — S83.232A COMPLEX TEAR OF MEDIAL MENISCUS OF LEFT KNEE AS CURRENT INJURY: Status: ACTIVE | Noted: 2023-12-07

## 2023-12-07 PROBLEM — S83.272A COMPLEX TEAR OF LATERAL MENISCUS OF LEFT KNEE AS CURRENT INJURY: Status: ACTIVE | Noted: 2023-12-07

## 2024-03-30 NOTE — PROGRESS NOTES
Hector Hanson attended: Exercise Workshop from [phone]      The topic was: Biomechanics    Patient received handouts regarding the specific exercise information    
oral